# Patient Record
Sex: MALE | Race: WHITE | Employment: OTHER | URBAN - METROPOLITAN AREA
[De-identification: names, ages, dates, MRNs, and addresses within clinical notes are randomized per-mention and may not be internally consistent; named-entity substitution may affect disease eponyms.]

---

## 2018-05-15 ENCOUNTER — HOSPITAL ENCOUNTER (OUTPATIENT)
Dept: WOUND CARE | Age: 78
Discharge: HOME | End: 2018-05-15
Attending: PHYSICAL MEDICINE & REHABILITATION
Payer: MEDICARE

## 2018-05-15 PROCEDURE — 99212 OFFICE O/P EST SF 10 MIN: CPT

## 2023-10-02 ENCOUNTER — TELEPHONE (OUTPATIENT)
Dept: ORTHOPEDIC SURGERY | Age: 83
End: 2023-10-02

## 2023-10-02 NOTE — TELEPHONE ENCOUNTER
Patient had lt tka done 17 years ago and is requesting Mariana Vogel. Saw Sabrina Mendez last week and was put in a brace that didn't help. They are looking for 2nd opinion because he was told not surgical.They can get records. 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 Patient was seen last week at [facility]. Please advise.  [phone]

## 2023-10-03 ENCOUNTER — TELEPHONE (OUTPATIENT)
Dept: ORTHOPEDIC SURGERY | Age: 83
End: 2023-10-03

## 2023-10-03 NOTE — TELEPHONE ENCOUNTER
Patient had lt tka done 17 years ago and is requesting [facility] after Sarah Cartwright turning down. Saw Katia Figueroa last week and was put in a brace that didn't help. They are looking for 2nd opinion because he was told not surgical.They can get records. 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 Patient was seen last week at [facility]. Please advise.  [phone]